# Patient Record
Sex: MALE | Race: WHITE | NOT HISPANIC OR LATINO | Employment: FULL TIME | URBAN - METROPOLITAN AREA
[De-identification: names, ages, dates, MRNs, and addresses within clinical notes are randomized per-mention and may not be internally consistent; named-entity substitution may affect disease eponyms.]

---

## 2017-01-03 ENCOUNTER — APPOINTMENT (OUTPATIENT)
Dept: OCCUPATIONAL MEDICINE | Facility: CLINIC | Age: 52
End: 2017-01-03
Payer: OTHER MISCELLANEOUS

## 2017-01-03 PROCEDURE — 99213 OFFICE O/P EST LOW 20 MIN: CPT

## 2017-01-10 ENCOUNTER — APPOINTMENT (OUTPATIENT)
Dept: OCCUPATIONAL MEDICINE | Facility: CLINIC | Age: 52
End: 2017-01-10
Payer: OTHER MISCELLANEOUS

## 2017-01-10 PROCEDURE — 99213 OFFICE O/P EST LOW 20 MIN: CPT

## 2018-01-09 NOTE — MISCELLANEOUS
Message  Return to work or school:   Shadi Patel is under my professional care  He was seen in my office on 1/11/16       Mr Dinora Hernandez is able to do sedentary work only  No bending, squatting, climbing, or kneeling          Signatures   Electronically signed by : Lenin Epps DO; Jan 11 2016  6:01PM EST                       (Author)

## 2018-01-11 NOTE — MISCELLANEOUS
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